# Patient Record
Sex: MALE | Race: WHITE | HISPANIC OR LATINO | Employment: FULL TIME | URBAN - METROPOLITAN AREA
[De-identification: names, ages, dates, MRNs, and addresses within clinical notes are randomized per-mention and may not be internally consistent; named-entity substitution may affect disease eponyms.]

---

## 2024-03-14 ENCOUNTER — LAB VISIT (OUTPATIENT)
Dept: LAB | Facility: HOSPITAL | Age: 49
End: 2024-03-14
Attending: UROLOGY

## 2024-03-14 ENCOUNTER — OFFICE VISIT (OUTPATIENT)
Dept: UROLOGY | Facility: CLINIC | Age: 49
End: 2024-03-14

## 2024-03-14 VITALS
WEIGHT: 224.44 LBS | BODY MASS INDEX: 30.4 KG/M2 | HEART RATE: 83 BPM | DIASTOLIC BLOOD PRESSURE: 80 MMHG | SYSTOLIC BLOOD PRESSURE: 132 MMHG | HEIGHT: 72 IN | RESPIRATION RATE: 18 BRPM

## 2024-03-14 DIAGNOSIS — R39.9 LOWER URINARY TRACT SYMPTOMS (LUTS): ICD-10-CM

## 2024-03-14 DIAGNOSIS — R30.0 DYSURIA: ICD-10-CM

## 2024-03-14 DIAGNOSIS — N41.9 PROSTATITIS, UNSPECIFIED PROSTATITIS TYPE: Primary | ICD-10-CM

## 2024-03-14 LAB
BILIRUB UR QL STRIP: NEGATIVE
COMPLEXED PSA SERPL-MCNC: 1.2 NG/ML (ref 0–4)
GLUCOSE UR QL STRIP: NEGATIVE
KETONES UR QL STRIP: NEGATIVE
LEUKOCYTE ESTERASE UR QL STRIP: NEGATIVE
PH, POC UA: 5.5
POC BLOOD, URINE: NEGATIVE
POC NITRATES, URINE: NEGATIVE
POC RESIDUAL URINE VOLUME: 27 ML (ref 0–100)
PROT UR QL STRIP: POSITIVE
SP GR UR STRIP: >=1.03 (ref 1–1.03)
UROBILINOGEN UR STRIP-ACNC: 0.2 (ref 0.1–1.1)

## 2024-03-14 PROCEDURE — 99203 OFFICE O/P NEW LOW 30 MIN: CPT | Mod: PBBFAC,PN,25 | Performed by: UROLOGY

## 2024-03-14 PROCEDURE — 36415 COLL VENOUS BLD VENIPUNCTURE: CPT | Mod: PN | Performed by: UROLOGY

## 2024-03-14 PROCEDURE — 99204 OFFICE O/P NEW MOD 45 MIN: CPT | Mod: S$PBB,,, | Performed by: UROLOGY

## 2024-03-14 PROCEDURE — 81003 URINALYSIS AUTO W/O SCOPE: CPT | Mod: PBBFAC,PN | Performed by: UROLOGY

## 2024-03-14 PROCEDURE — 51798 US URINE CAPACITY MEASURE: CPT | Mod: PBBFAC,PN | Performed by: UROLOGY

## 2024-03-14 PROCEDURE — 99999 PR PBB SHADOW E&M-NEW PATIENT-LVL III: CPT | Mod: PBBFAC,,, | Performed by: UROLOGY

## 2024-03-14 PROCEDURE — 99999PBSHW POCT BLADDER SCAN: Mod: PBBFAC,,,

## 2024-03-14 PROCEDURE — 84153 ASSAY OF PSA TOTAL: CPT | Performed by: UROLOGY

## 2024-03-14 PROCEDURE — 99999PBSHW POCT URINALYSIS, DIPSTICK, AUTOMATED, W/O SCOPE: Mod: PBBFAC,,,

## 2024-03-14 NOTE — PROGRESS NOTES
Chief Complaint:   Encounter Diagnoses   Name Primary?    Lower urinary tract symptoms (LUTS)     Dysuria     Prostatitis, unspecified prostatitis type Yes       HPI:  HPI Bert Connelly justine 48 y.o. adult who presents with complaints of suprapubic discomfort and pain prior to urination for about 2 months.  He works in construction.  He states when he wears a heavy belt he can feel pressure in his suprapubic area.  He wanted to get checked out for possible prostate problems.  He does not get routine checkups with a PCP.  He is here with his wife.  She initially attempted to interpret however I saw him with the aid of an online .  He denies any nocturia, frequency urgency.  He does have dysuria intermittently when he was working.  He states he drinks 1 cup of coffee in the morning and occasionally has a red bull.    History:  Social History     Tobacco Use    Smoking status: Never    Smokeless tobacco: Never     History reviewed. No pertinent past medical history.  History reviewed. No pertinent surgical history.  History reviewed. No pertinent family history.    No current outpatient medications on file prior to visit.     No current facility-administered medications on file prior to visit.        Objective:     Vitals:    03/14/24 0856   BP: 132/80   Pulse: 83   Resp: 18   Weight: 101.8 kg (224 lb 6.9 oz)   Height: 6' (1.829 m)      BMI Readings from Last 1 Encounters:   03/14/24 30.44 kg/m²          Physical Exam  No acute distress alert and oriented   Respirations even unlabored   Abdomen is soft nontender  Penis without any lesion non circumcised  Bilateral testicles palpably normal   Digital rectal exam reveals a 35 g smooth prostate with some bogginess bilaterally.    Assessment:       1. Prostatitis, unspecified prostatitis type    2. Lower urinary tract symptoms (LUTS)    3. Dysuria        Plan:     1. Prostatitis, unspecified prostatitis type    2. Lower urinary tract symptoms (LUTS)    3. Dysuria        Orders Placed This Encounter    Prostate Specific Antigen, Diagnostic    POCT Urinalysis, Dipstick, Automated, W/O Scope    POCT Bladder Scan      Suspect based on his symptom constellation that he does have prostatitis.  I discussed treatment with antibiotics and a flat inflammatories and alpha blockers.  Patient was states he would like to avoid medications at this time.  He states he will increase hydration and stop the caffeine and see if this improves.  He said he will call us back if he has not any better.  I have also asked him to proceed with a PSA today to get a baseline check.  We will notify him if this is abnormal.

## 2024-03-14 NOTE — PROGRESS NOTES
03/14/2024 0900 -  services needed for patient, Dylan used and  Pas.. #[id number] used to assist patient with communication with Dr. Hall.    Kodi Lewis RN